# Patient Record
Sex: FEMALE | Race: WHITE | Employment: FULL TIME | URBAN - METROPOLITAN AREA
[De-identification: names, ages, dates, MRNs, and addresses within clinical notes are randomized per-mention and may not be internally consistent; named-entity substitution may affect disease eponyms.]

---

## 2019-09-17 ENCOUNTER — OFFICE VISIT (OUTPATIENT)
Dept: RHEUMATOLOGY | Age: 29
End: 2019-09-17
Payer: COMMERCIAL

## 2019-09-17 VITALS
BODY MASS INDEX: 36.8 KG/M2 | DIASTOLIC BLOOD PRESSURE: 80 MMHG | HEIGHT: 62 IN | SYSTOLIC BLOOD PRESSURE: 120 MMHG | WEIGHT: 200 LBS

## 2019-09-17 DIAGNOSIS — Z79.899 HIGH RISK MEDICATION USE: ICD-10-CM

## 2019-09-17 DIAGNOSIS — M06.09 RHEUMATOID ARTHRITIS OF MULTIPLE SITES WITH NEGATIVE RHEUMATOID FACTOR (HCC): Primary | ICD-10-CM

## 2019-09-17 LAB
A/G RATIO: 2.1 (ref 1.1–2.2)
ALBUMIN SERPL-MCNC: 5.2 G/DL (ref 3.4–5)
ALP BLD-CCNC: 57 U/L (ref 40–129)
ALT SERPL-CCNC: 15 U/L (ref 10–40)
ANION GAP SERPL CALCULATED.3IONS-SCNC: 15 MMOL/L (ref 3–16)
AST SERPL-CCNC: 18 U/L (ref 15–37)
BASOPHILS ABSOLUTE: 0 K/UL (ref 0–0.2)
BASOPHILS RELATIVE PERCENT: 0.3 %
BILIRUB SERPL-MCNC: 0.5 MG/DL (ref 0–1)
BUN BLDV-MCNC: 17 MG/DL (ref 7–20)
C-REACTIVE PROTEIN: 2 MG/L (ref 0–5.1)
CALCIUM SERPL-MCNC: 9.6 MG/DL (ref 8.3–10.6)
CHLORIDE BLD-SCNC: 99 MMOL/L (ref 99–110)
CO2: 24 MMOL/L (ref 21–32)
CREAT SERPL-MCNC: 0.8 MG/DL (ref 0.6–1.1)
EOSINOPHILS ABSOLUTE: 0.1 K/UL (ref 0–0.6)
EOSINOPHILS RELATIVE PERCENT: 1.4 %
GFR AFRICAN AMERICAN: >60
GFR NON-AFRICAN AMERICAN: >60
GLOBULIN: 2.5 G/DL
GLUCOSE BLD-MCNC: 92 MG/DL (ref 70–99)
HCT VFR BLD CALC: 36.7 % (ref 36–48)
HEMOGLOBIN: 12.5 G/DL (ref 12–16)
LYMPHOCYTES ABSOLUTE: 2.2 K/UL (ref 1–5.1)
LYMPHOCYTES RELATIVE PERCENT: 33.7 %
MCH RBC QN AUTO: 32 PG (ref 26–34)
MCHC RBC AUTO-ENTMCNC: 33.9 G/DL (ref 31–36)
MCV RBC AUTO: 94.4 FL (ref 80–100)
MONOCYTES ABSOLUTE: 0.6 K/UL (ref 0–1.3)
MONOCYTES RELATIVE PERCENT: 9 %
NEUTROPHILS ABSOLUTE: 3.6 K/UL (ref 1.7–7.7)
NEUTROPHILS RELATIVE PERCENT: 55.6 %
PDW BLD-RTO: 12.3 % (ref 12.4–15.4)
PLATELET # BLD: 287 K/UL (ref 135–450)
PMV BLD AUTO: 7.4 FL (ref 5–10.5)
POTASSIUM SERPL-SCNC: 4.3 MMOL/L (ref 3.5–5.1)
RBC # BLD: 3.89 M/UL (ref 4–5.2)
SEDIMENTATION RATE, ERYTHROCYTE: 12 MM/HR (ref 0–20)
SODIUM BLD-SCNC: 138 MMOL/L (ref 136–145)
TOTAL PROTEIN: 7.7 G/DL (ref 6.4–8.2)
WBC # BLD: 6.5 K/UL (ref 4–11)

## 2019-09-17 PROCEDURE — 99205 OFFICE O/P NEW HI 60 MIN: CPT | Performed by: INTERNAL MEDICINE

## 2019-09-17 RX ORDER — ADALIMUMAB 40MG/0.4ML
40 KIT SUBCUTANEOUS
COMMUNITY
Start: 2019-09-09 | End: 2021-07-19

## 2019-09-17 RX ORDER — M-VIT,TX,IRON,MINS/CALC/FOLIC 27MG-0.4MG
1 TABLET ORAL DAILY
COMMUNITY

## 2019-09-17 RX ORDER — SULFASALAZINE 500 MG/1
3 TABLET ORAL 2 TIMES DAILY
Refills: 1 | COMMUNITY
Start: 2019-07-22 | End: 2020-10-13 | Stop reason: SDUPTHER

## 2019-09-17 RX ORDER — SULFASALAZINE 500 MG/1
TABLET ORAL
Qty: 180 TABLET | Refills: 3 | Status: SHIPPED | OUTPATIENT
Start: 2019-09-17 | End: 2020-05-04 | Stop reason: SDUPTHER

## 2019-09-17 SDOH — HEALTH STABILITY: MENTAL HEALTH: HOW MANY STANDARD DRINKS CONTAINING ALCOHOL DO YOU HAVE ON A TYPICAL DAY?: 1 OR 2

## 2019-09-17 SDOH — HEALTH STABILITY: MENTAL HEALTH: HOW OFTEN DO YOU HAVE A DRINK CONTAINING ALCOHOL?: 2-4 TIMES A MONTH

## 2019-09-17 NOTE — PROGRESS NOTES
BMI 36.58 kg/m²   General appearance/ Psychiatric: well nourished, and well groomed, normal judgement, alert, appears stated age and cooperative. MKS: Normal musculoskeletal examination and upper, lower extremities and spine without any subjective or objective findings. Skin: No rashes, no induration or skin thickening or nodules. No evidence ischemia or deformities noted in digits or nails. HEENT: Normal lids, lacrimal glands and pupils. No oral or nasal ulcers. Salivary glands reveal no evidence of abnormality. External inspection of the ears and nose within normal limits. Neck: No masses or asymmetry. No thyroid enlargement. Chest: Normal effort, clear to auscultation. Lymph nodes: No enlargement in cervical, supraclavicular regions. Neurologic: normal deep tendon reflexes. No foot or wrist drop. DATA:   A/P- See above.

## 2019-09-20 LAB
QUANTI TB GOLD PLUS: NEGATIVE
QUANTI TB1 MINUS NIL: 0 IU/ML (ref 0–0.34)
QUANTI TB2 MINUS NIL: 0 IU/ML (ref 0–0.34)
QUANTIFERON MITOGEN: 9.38 IU/ML
QUANTIFERON NIL: 0.02 IU/ML

## 2019-12-10 ENCOUNTER — OFFICE VISIT (OUTPATIENT)
Dept: RHEUMATOLOGY | Age: 29
End: 2019-12-10
Payer: COMMERCIAL

## 2019-12-10 VITALS
HEIGHT: 62 IN | BODY MASS INDEX: 37.54 KG/M2 | WEIGHT: 204 LBS | SYSTOLIC BLOOD PRESSURE: 120 MMHG | DIASTOLIC BLOOD PRESSURE: 78 MMHG

## 2019-12-10 DIAGNOSIS — M06.09 RHEUMATOID ARTHRITIS OF MULTIPLE SITES WITH NEGATIVE RHEUMATOID FACTOR (HCC): ICD-10-CM

## 2019-12-10 DIAGNOSIS — Z79.899 HIGH RISK MEDICATION USE: Primary | ICD-10-CM

## 2019-12-10 LAB
ALBUMIN SERPL-MCNC: 5.1 G/DL (ref 3.4–5)
ALP BLD-CCNC: 57 U/L (ref 40–129)
ALT SERPL-CCNC: 14 U/L (ref 10–40)
AST SERPL-CCNC: 17 U/L (ref 15–37)
BASOPHILS ABSOLUTE: 0 K/UL (ref 0–0.2)
BASOPHILS RELATIVE PERCENT: 0.4 %
BILIRUB SERPL-MCNC: 0.5 MG/DL (ref 0–1)
BILIRUBIN DIRECT: <0.2 MG/DL (ref 0–0.3)
BILIRUBIN, INDIRECT: ABNORMAL MG/DL (ref 0–1)
C-REACTIVE PROTEIN: 2.1 MG/L (ref 0–5.1)
CREAT SERPL-MCNC: 0.6 MG/DL (ref 0.6–1.1)
EOSINOPHILS ABSOLUTE: 0.1 K/UL (ref 0–0.6)
EOSINOPHILS RELATIVE PERCENT: 2 %
GFR AFRICAN AMERICAN: >60
GFR NON-AFRICAN AMERICAN: >60
HCT VFR BLD CALC: 36.5 % (ref 36–48)
HEMOGLOBIN: 12.5 G/DL (ref 12–16)
LYMPHOCYTES ABSOLUTE: 3 K/UL (ref 1–5.1)
LYMPHOCYTES RELATIVE PERCENT: 42.9 %
MCH RBC QN AUTO: 32.4 PG (ref 26–34)
MCHC RBC AUTO-ENTMCNC: 34.2 G/DL (ref 31–36)
MCV RBC AUTO: 94.8 FL (ref 80–100)
MONOCYTES ABSOLUTE: 0.7 K/UL (ref 0–1.3)
MONOCYTES RELATIVE PERCENT: 10.7 %
NEUTROPHILS ABSOLUTE: 3 K/UL (ref 1.7–7.7)
NEUTROPHILS RELATIVE PERCENT: 44 %
PDW BLD-RTO: 12.3 % (ref 12.4–15.4)
PLATELET # BLD: 274 K/UL (ref 135–450)
PMV BLD AUTO: 7.9 FL (ref 5–10.5)
RBC # BLD: 3.85 M/UL (ref 4–5.2)
SEDIMENTATION RATE, ERYTHROCYTE: 16 MM/HR (ref 0–20)
TOTAL PROTEIN: 7.8 G/DL (ref 6.4–8.2)
WBC # BLD: 6.9 K/UL (ref 4–11)

## 2019-12-10 PROCEDURE — 99214 OFFICE O/P EST MOD 30 MIN: CPT | Performed by: INTERNAL MEDICINE

## 2020-03-09 ENCOUNTER — OFFICE VISIT (OUTPATIENT)
Dept: RHEUMATOLOGY | Age: 30
End: 2020-03-09
Payer: COMMERCIAL

## 2020-03-09 VITALS
BODY MASS INDEX: 38.83 KG/M2 | SYSTOLIC BLOOD PRESSURE: 120 MMHG | HEIGHT: 62 IN | DIASTOLIC BLOOD PRESSURE: 78 MMHG | WEIGHT: 211 LBS

## 2020-03-09 LAB
A/G RATIO: 1.8 (ref 1.1–2.2)
ALBUMIN SERPL-MCNC: 4.8 G/DL (ref 3.4–5)
ALP BLD-CCNC: 62 U/L (ref 40–129)
ALT SERPL-CCNC: 16 U/L (ref 10–40)
ANION GAP SERPL CALCULATED.3IONS-SCNC: 13 MMOL/L (ref 3–16)
AST SERPL-CCNC: 20 U/L (ref 15–37)
BASOPHILS ABSOLUTE: 0 K/UL (ref 0–0.2)
BASOPHILS RELATIVE PERCENT: 0.8 %
BILIRUB SERPL-MCNC: 0.7 MG/DL (ref 0–1)
BUN BLDV-MCNC: 12 MG/DL (ref 7–20)
C-REACTIVE PROTEIN: 3.3 MG/L (ref 0–5.1)
CALCIUM SERPL-MCNC: 9.2 MG/DL (ref 8.3–10.6)
CHLORIDE BLD-SCNC: 104 MMOL/L (ref 99–110)
CO2: 22 MMOL/L (ref 21–32)
CREAT SERPL-MCNC: 0.6 MG/DL (ref 0.6–1.1)
EOSINOPHILS ABSOLUTE: 0.4 K/UL (ref 0–0.6)
EOSINOPHILS RELATIVE PERCENT: 8.4 %
GFR AFRICAN AMERICAN: >60
GFR NON-AFRICAN AMERICAN: >60
GLOBULIN: 2.6 G/DL
GLUCOSE BLD-MCNC: 93 MG/DL (ref 70–99)
HCT VFR BLD CALC: 34.7 % (ref 36–48)
HEMOGLOBIN: 11.8 G/DL (ref 12–16)
LYMPHOCYTES ABSOLUTE: 2.1 K/UL (ref 1–5.1)
LYMPHOCYTES RELATIVE PERCENT: 40.4 %
MCH RBC QN AUTO: 32.4 PG (ref 26–34)
MCHC RBC AUTO-ENTMCNC: 34.2 G/DL (ref 31–36)
MCV RBC AUTO: 94.8 FL (ref 80–100)
MONOCYTES ABSOLUTE: 0.4 K/UL (ref 0–1.3)
MONOCYTES RELATIVE PERCENT: 7.7 %
NEUTROPHILS ABSOLUTE: 2.2 K/UL (ref 1.7–7.7)
NEUTROPHILS RELATIVE PERCENT: 42.7 %
PDW BLD-RTO: 12.4 % (ref 12.4–15.4)
PLATELET # BLD: 255 K/UL (ref 135–450)
PMV BLD AUTO: 7.4 FL (ref 5–10.5)
POTASSIUM SERPL-SCNC: 4 MMOL/L (ref 3.5–5.1)
RBC # BLD: 3.66 M/UL (ref 4–5.2)
SODIUM BLD-SCNC: 139 MMOL/L (ref 136–145)
TOTAL PROTEIN: 7.4 G/DL (ref 6.4–8.2)
WBC # BLD: 5.3 K/UL (ref 4–11)

## 2020-03-09 PROCEDURE — 99214 OFFICE O/P EST MOD 30 MIN: CPT | Performed by: INTERNAL MEDICINE

## 2020-03-09 NOTE — PROGRESS NOTES
autoimmune diseases    Current Outpatient Medications   Medication Sig Dispense Refill    Adalimumab (HUMIRA) 40 MG/0.4ML PSKT Inject 40 mg sc Q 14 days. Please dispense pen. 2 each 5    HUMIRA 40 MG/0.4ML PSKT Inject 40 mg into the skin every 14 days      sulfaSALAzine (AZULFIDINE) 500 MG tablet Take 3 tablets by mouth 2 times daily  1    Multiple Vitamins-Minerals (THERAPEUTIC MULTIVITAMIN-MINERALS) tablet Take 1 tablet by mouth daily      sulfaSALAzine (AZULFIDINE) 500 MG tablet Take 3 tab po  tablet 3    Adalimumab (HUMIRA) 40 MG/0.4ML PSKT Inject 40 mg sc Q 14 days. 2 each 5     No current facility-administered medications for this visit. Allergies   Allergen Reactions    Amoxicillin     Pcn [Penicillins]        PHYSICAL EXAM:    Vitals:    /78   Ht 5' 2.01\" (1.575 m)   Wt 211 lb (95.7 kg)   BMI 38.58 kg/m²   General appearance/ Psychiatric: well nourished, and well groomed, normal judgement, alert, appears stated age and cooperative. MKS: Normal musculoskeletal examination upper, lower extremities and spine. She does not have any tender, swollen or inflamed joints in upper or lower extremities. Skin: No rashes, no induration or skin thickening or nodules. No evidence ischemia or deformities noted in digits or nails.     DATA:   Lab Results   Component Value Date    WBC 6.9 12/10/2019    HGB 12.5 12/10/2019    HCT 36.5 12/10/2019    MCV 94.8 12/10/2019     12/10/2019     Lab Results   Component Value Date     09/17/2019    K 4.3 09/17/2019    CL 99 09/17/2019    CO2 24 09/17/2019    BUN 17 09/17/2019    CREATININE 0.6 12/10/2019    GLUCOSE 92 09/17/2019    CALCIUM 9.6 09/17/2019    PROT 7.8 12/10/2019    LABALBU 5.1 (H) 12/10/2019    BILITOT 0.5 12/10/2019    ALKPHOS 57 12/10/2019    AST 17 12/10/2019    ALT 14 12/10/2019    LABGLOM >60 12/10/2019    GFRAA >60 12/10/2019    AGRATIO 2.1 09/17/2019    GLOB 2.5 09/17/2019       Lab Results   Component Value Date

## 2020-05-04 RX ORDER — SULFASALAZINE 500 MG/1
TABLET ORAL
Qty: 180 TABLET | Refills: 0 | Status: SHIPPED | OUTPATIENT
Start: 2020-05-04 | End: 2020-06-08

## 2020-06-22 ENCOUNTER — VIRTUAL VISIT (OUTPATIENT)
Dept: RHEUMATOLOGY | Age: 30
End: 2020-06-22
Payer: COMMERCIAL

## 2020-06-22 PROCEDURE — 99214 OFFICE O/P EST MOD 30 MIN: CPT | Performed by: INTERNAL MEDICINE

## 2020-06-24 LAB
ALT SERPL-CCNC: 15 U/L (ref 10–40)
AST SERPL-CCNC: 17 U/L (ref 15–37)
BASOPHILS ABSOLUTE: 0 K/UL (ref 0–0.2)
BASOPHILS RELATIVE PERCENT: 0.4 %
C-REACTIVE PROTEIN: 1.9 MG/L (ref 0–5.1)
CREAT SERPL-MCNC: 0.5 MG/DL (ref 0.6–1.1)
EOSINOPHILS ABSOLUTE: 0.1 K/UL (ref 0–0.6)
EOSINOPHILS RELATIVE PERCENT: 2.5 %
GFR AFRICAN AMERICAN: >60
GFR NON-AFRICAN AMERICAN: >60
HCT VFR BLD CALC: 37.1 % (ref 36–48)
HEMOGLOBIN: 12.6 G/DL (ref 12–16)
LYMPHOCYTES ABSOLUTE: 1.9 K/UL (ref 1–5.1)
LYMPHOCYTES RELATIVE PERCENT: 40.4 %
MCH RBC QN AUTO: 32.3 PG (ref 26–34)
MCHC RBC AUTO-ENTMCNC: 33.8 G/DL (ref 31–36)
MCV RBC AUTO: 95.4 FL (ref 80–100)
MONOCYTES ABSOLUTE: 0.4 K/UL (ref 0–1.3)
MONOCYTES RELATIVE PERCENT: 8.9 %
NEUTROPHILS ABSOLUTE: 2.3 K/UL (ref 1.7–7.7)
NEUTROPHILS RELATIVE PERCENT: 47.8 %
PDW BLD-RTO: 12.2 % (ref 12.4–15.4)
PLATELET # BLD: 269 K/UL (ref 135–450)
PMV BLD AUTO: 7.8 FL (ref 5–10.5)
RBC # BLD: 3.89 M/UL (ref 4–5.2)
SEDIMENTATION RATE, ERYTHROCYTE: 16 MM/HR (ref 0–20)
WBC # BLD: 4.8 K/UL (ref 4–11)

## 2020-07-27 RX ORDER — ADALIMUMAB 40MG/0.4ML
KIT SUBCUTANEOUS
Qty: 2 EACH | Refills: 2 | Status: SHIPPED | OUTPATIENT
Start: 2020-07-27 | End: 2021-06-24 | Stop reason: SDUPTHER

## 2020-09-01 RX ORDER — SULFASALAZINE 500 MG/1
TABLET ORAL
Qty: 540 TABLET | Refills: 0 | Status: SHIPPED | OUTPATIENT
Start: 2020-09-01 | End: 2021-07-19

## 2020-10-13 ENCOUNTER — OFFICE VISIT (OUTPATIENT)
Dept: RHEUMATOLOGY | Age: 30
End: 2020-10-13
Payer: COMMERCIAL

## 2020-10-13 VITALS — WEIGHT: 211 LBS | BODY MASS INDEX: 38.83 KG/M2 | HEIGHT: 62 IN | TEMPERATURE: 98 F

## 2020-10-13 LAB
ALT SERPL-CCNC: 15 U/L (ref 10–40)
AST SERPL-CCNC: 16 U/L (ref 15–37)
BASOPHILS ABSOLUTE: 0 K/UL (ref 0–0.2)
BASOPHILS RELATIVE PERCENT: 0.4 %
CREAT SERPL-MCNC: 0.8 MG/DL (ref 0.6–1.1)
EOSINOPHILS ABSOLUTE: 0.1 K/UL (ref 0–0.6)
EOSINOPHILS RELATIVE PERCENT: 1.2 %
GFR AFRICAN AMERICAN: >60
GFR NON-AFRICAN AMERICAN: >60
HCT VFR BLD CALC: 38 % (ref 36–48)
HEMOGLOBIN: 12.8 G/DL (ref 12–16)
LYMPHOCYTES ABSOLUTE: 2.7 K/UL (ref 1–5.1)
LYMPHOCYTES RELATIVE PERCENT: 37.9 %
MCH RBC QN AUTO: 31.8 PG (ref 26–34)
MCHC RBC AUTO-ENTMCNC: 33.8 G/DL (ref 31–36)
MCV RBC AUTO: 94 FL (ref 80–100)
MONOCYTES ABSOLUTE: 0.6 K/UL (ref 0–1.3)
MONOCYTES RELATIVE PERCENT: 8 %
NEUTROPHILS ABSOLUTE: 3.7 K/UL (ref 1.7–7.7)
NEUTROPHILS RELATIVE PERCENT: 52.5 %
PDW BLD-RTO: 12.3 % (ref 12.4–15.4)
PLATELET # BLD: 321 K/UL (ref 135–450)
PMV BLD AUTO: 7.5 FL (ref 5–10.5)
RBC # BLD: 4.04 M/UL (ref 4–5.2)
WBC # BLD: 7.1 K/UL (ref 4–11)

## 2020-10-13 PROCEDURE — 85025 COMPLETE CBC W/AUTO DIFF WBC: CPT | Performed by: INTERNAL MEDICINE

## 2020-10-13 PROCEDURE — 99214 OFFICE O/P EST MOD 30 MIN: CPT | Performed by: INTERNAL MEDICINE

## 2020-10-13 RX ORDER — ADALIMUMAB 40MG/0.4ML
40 KIT SUBCUTANEOUS
Qty: 2 EACH | Refills: 3 | Status: SHIPPED | OUTPATIENT
Start: 2020-10-13 | End: 2021-01-18

## 2020-10-13 RX ORDER — SULFASALAZINE 500 MG/1
1500 TABLET ORAL 2 TIMES DAILY
Qty: 540 TABLET | Refills: 0 | Status: SHIPPED | OUTPATIENT
Start: 2020-10-13 | End: 2021-05-11

## 2020-10-13 NOTE — PROGRESS NOTES
[address] [facility], [place]                                                                                                                        [phone] (P) [phone] (F)      Dear  No primary care provider on file.:  Please find Rheumatology assessment. Thank you for giving me the opportunity to be involved in Laura Elam's care and I look forward following Laura along with you. If you have any questions or concerns please feel free to reach me. Note is transcribed using voice recognition software. Inadvertent computerized transcription errors may be present. Patient identification: Antionette Hun: 1990,30 y.o. Sex: female     A/P  Laura was seen today for follow-up. Diagnoses and all orders for this visit:    Rheumatoid arthritis involving multiple sites with positive rheumatoid factor (HCC)  -     Adalimumab (HUMIRA PEN) 40 MG/0.4ML PNKT; Inject 40 mg into the skin every 14 days  -     sulfaSALAzine (AZULFIDINE) 500 MG tablet; Take 3 tablets by mouth 2 times daily    High risk medication use  -     CBC Auto Differential  -     AST(SGOT) & ALT(SGPT)  -     Creatinine, Serum      Rheumatoid arthritis-onset 2012-dramatic on Humira 40 mg every 2 weekly (started 1/20/2018, and sulfasalazine 1.5 g twice daily. Normal ADLs and recreational activities. .    Previous therapy included methotrexate, Plaquenil and Enbrel-lost efficacy after 1 year, Orencia-did not help. Plan-  Check safety labs today, may try reducing sulfasalazine 1 g twice daily, stay on current dose of Humira. If she gets symptomatic, increase sulfasalazine to the previous doses. Recommend flu shot. Follow-up in 3 months. Patient indicates understanding and agrees with the management plan.   I reviewed patient's history, referral documents and electronic medical records. #######################################################################    Zzlfjxjcch-kufadt-tq for seronegative rheumatoid arthritis. Interval Junious Ama is doing very well. Has no complaints or concerns. No flares since last seen. Is pleased with current regimen. Tolerating medications well. Denies any pain, swelling, stiffness in her joints. ADLs and recreational activities are normal. Tolerating medications well. All other ROS are negative. Past Medical History:   Diagnosis Date    Rheumatoid arthritis ([facility] [address] 2012     History reviewed. No pertinent surgical history. Social History     Socioeconomic History    Marital status:      Spouse name: Not on file    Number of children: Not on file    Years of education: Not on file    Highest education level: Not on file   Occupational History    Not on file   Social Needs    Financial resource strain: Not on file    Food insecurity     Worry: Not on file     Inability: Not on file    Transportation needs     Medical: Not on file     Non-medical: Not on file   Tobacco Use    Smoking status: Never Smoker    Smokeless tobacco: Never Used   Substance and Sexual Activity    Alcohol use:  Yes     Alcohol/week: 6.0 standard drinks     Types: 2 Glasses of wine, 4 Shots of liquor per week     Frequency: 2-4 times a month     Drinks per session: 1 or 2    Drug use: Never    Sexual activity: Not on file   Lifestyle    Physical activity     Days per week: Not on file     Minutes per session: Not on file    Stress: Not on file   Relationships    Social connections     Talks on phone: Not on file     Gets together: Not on file     Attends Denominational service: Not on file     Active member of club or organization: Not on file     Attends meetings of clubs or organizations: Not on file     Relationship status: Not on file    Intimate partner violence     Fear of current or ex partner: Not on file Emotionally abused: Not on file     Physically abused: Not on file     Forced sexual activity: Not on file   Other Topics Concern    Not on file   Social History Narrative    Not on file       No family history of autoimmune diseases    Current Outpatient Medications   Medication Sig Dispense Refill    Adalimumab (HUMIRA PEN) 40 MG/0.4ML PNKT Inject 40 mg into the skin every 14 days 2 each 3    sulfaSALAzine (AZULFIDINE) 500 MG tablet Take 3 tablets by mouth 2 times daily 540 tablet 0    sulfaSALAzine (AZULFIDINE) 500 MG tablet TAKE 3 TABLETS BY MOUTH TWICE A  tablet 0    HUMIRA 40 MG/0.4ML PSKT INJECT 1 SYRINGE UNDER THE SKIN EVERY 14 DAYS. 2 each 2    HUMIRA 40 MG/0.4ML PSKT Inject 40 mg into the skin every 14 days      Multiple Vitamins-Minerals (THERAPEUTIC MULTIVITAMIN-MINERALS) tablet Take 1 tablet by mouth daily      Adalimumab (HUMIRA) 40 MG/0.4ML PSKT Inject 40 mg sc Q 14 days. 2 each 5     No current facility-administered medications for this visit. Allergies   Allergen Reactions    Amoxicillin     Pcn [Penicillins]        PHYSICAL EXAM:    Vitals:    Temp 98 °F (36.7 °C) (Skin)   Ht 5' 2\" (1.575 m)   Wt 211 lb (95.7 kg)   BMI 38.59 kg/m²   General appearance/ Psychiatric: well nourished, and well groomed, normal judgement, alert, appears stated age and cooperative. MKS: She does not have any tender, swollen or inflamed joints in the upper or lower extremities, full range of motion all peripheral joints. Skin: No rashes,No evidence ischemia or deformities in digits or nails.       DATA:   Lab Results   Component Value Date    WBC 4.8 06/24/2020    HGB 12.6 06/24/2020    HCT 37.1 06/24/2020    MCV 95.4 06/24/2020     06/24/2020     Lab Results   Component Value Date     03/09/2020    K 4.0 03/09/2020     03/09/2020    CO2 22 03/09/2020    BUN 12 03/09/2020    CREATININE 0.5 (L) 06/24/2020    GLUCOSE 93 03/09/2020    CALCIUM 9.2 03/09/2020    PROT 7.4 03/09/2020    LABALBU 4.8 03/09/2020    BILITOT 0.7 03/09/2020    ALKPHOS 62 03/09/2020    AST 17 06/24/2020    ALT 15 06/24/2020    LABGLOM >60 06/24/2020    GFRAA >60 06/24/2020    AGRATIO 1.8 03/09/2020    GLOB 2.6 03/09/2020       Lab Results   Component Value Date    SEDRATE 16 06/24/2020     Lab Results   Component Value Date    SEDRATE 16 06/24/2020       A/P- See above.

## 2021-01-14 ENCOUNTER — VIRTUAL VISIT (OUTPATIENT)
Dept: RHEUMATOLOGY | Age: 31
End: 2021-01-14
Payer: COMMERCIAL

## 2021-01-14 DIAGNOSIS — M05.79 RHEUMATOID ARTHRITIS INVOLVING MULTIPLE SITES WITH POSITIVE RHEUMATOID FACTOR (HCC): Primary | ICD-10-CM

## 2021-01-14 DIAGNOSIS — Z79.899 HIGH RISK MEDICATION USE: ICD-10-CM

## 2021-01-14 PROCEDURE — 99214 OFFICE O/P EST MOD 30 MIN: CPT | Performed by: INTERNAL MEDICINE

## 2021-01-14 NOTE — PROGRESS NOTES
Jesus Carmona is a 27 y.o. female being evaluated by a Virtual Visit (video visit) encounter to address concerns as mentioned above. A caregiver was present when appropriate. Due to this being a TeleHealth encounter ([facility]), evaluation of the following organ systems was limited: Vitals/Constitutional/EENT/Resp/CV/GI//MS/Neuro/Skin/Heme-Lymph-Imm. Pursuant to the emergency declaration under the [address] [place] [address] and the [employer] Act, this Virtual Visit was conducted with patient's (and/or legal guardian's) consent, to reduce the patient's risk of exposure to COVID-19 and provide necessary medical care. The patient (and/or legal guardian) has also been advised to contact this office for worsening conditions or problems, and seek emergency medical treatment and/or call 911 if deemed necessary. Patient identification was verified at the start of the visit: Yes    Total time spent for this encounter: Not billed by time    Services were provided through a video synchronous discussion virtually to substitute for in-person clinic visit. Patient and provider were located at their individual homes. --Vicente Veloz MD on 1/14/2021 at 1:39 PM    An electronic signature was used to authenticate this note. [address] [place] [address], [place]                                                                                                                        [phone] (U) [phone] (F)      Dear Dr. White primary care provider on file.:  Please find Rheumatology assessment. Thank you for giving me the opportunity to be involved in Laura Papa's care and I look forward following Laura zamarripa with you. If you have any questions or concerns please feel free to reach me. Note is transcribed using voice recognition software. Inadvertent computerized transcription errors may be present. Patient identification: Chin Aleman: 1990,30 y.o. Sex: female     A/P  Laura was seen today for follow-up and arthritis. Diagnoses and all orders for this visit:    Rheumatoid arthritis involving multiple sites with positive rheumatoid factor (HCC)    High risk medication use  -     AST(SGOT) & ALT(SGPT); Standing  -     CBC Auto Differential; Standing  -     C-Reactive Protein; Standing  -     Creatinine, Serum; Standing  -     Sedimentation Rate; Standing      Rheumatoid arthritis-onset 2012-doing well on current regimen  Humira 40 mg every 2 weekly (started 1/20/2018, and sulfasalazine 1 g twice daily. Normal ADLs and recreational activities. .    Previous therapy included methotrexate, Plaquenil and Enbrel-lost efficacy after 1 year, Orencia-did not help. Plan-  Due for safety labs, may reduce sulfasalazine 1 tablet p.o. twice daily, currently taking 2 tablets twice a day. If she notices musculoskeletal symptoms, resume original doses. Continue Humira. Call me with any flares or symptoms. Recommendations for COVID-19 vaccine was discussed with patient. Follow-up in 3 months. Patient indicates understanding and agrees with the management plan. I reviewed patient's history, referral documents and electronic medical records. #######################################################################    Llnelaeqgh-ifxdjw-tm for seronegative rheumatoid arthritis. Interval Chela Session is doing well on current regimen, denies any pain, swelling, stiffness in her joints. Normal ADLs and recreational activities. Pleased with current therapy. Her previous labs are normal.   ADLs and recreational activities are normal.  All other ROS are negative.         No family history of autoimmune diseases    Current Outpatient Medications   Medication Sig Dispense Refill    Adalimumab (HUMIRA PEN) 40 MG/0.4ML PNKT Inject 40 mg into the skin every 14 days 2 each 3    sulfaSALAzine (AZULFIDINE) 500 MG tablet Take 3 tablets by mouth 2 times daily 540 tablet 0    sulfaSALAzine (AZULFIDINE) 500 MG tablet TAKE 3 TABLETS BY MOUTH TWICE A  tablet 0    HUMIRA 40 MG/0.4ML PSKT INJECT 1 SYRINGE UNDER THE SKIN EVERY 14 DAYS. 2 each 2    HUMIRA 40 MG/0.4ML PSKT Inject 40 mg into the skin every 14 days      Multiple Vitamins-Minerals (THERAPEUTIC MULTIVITAMIN-MINERALS) tablet Take 1 tablet by mouth daily      Adalimumab (HUMIRA) 40 MG/0.4ML PSKT Inject 40 mg sc Q 14 days. 2 each 5     No current facility-administered medications for this visit. Allergies   Allergen Reactions    Amoxicillin     Pcn [Penicillins]        PHYSICAL EXAM:    Vitals: There were no vitals taken for this visit. General appearance/ Psychiatric: well nourished, and well groomed, normal judgement, alert, appears stated age and cooperative. No musculoskeletal findings to be seen in VS.  No subjective objective concerns. DATA:   Lab Results   Component Value Date    WBC 7.1 10/13/2020    HGB 12.8 10/13/2020    HCT 38.0 10/13/2020    MCV 94.0 10/13/2020     10/13/2020     Lab Results   Component Value Date     03/09/2020    K 4.0 03/09/2020     03/09/2020    CO2 22 03/09/2020    BUN 12 03/09/2020    CREATININE 0.8 10/13/2020    GLUCOSE 93 03/09/2020    CALCIUM 9.2 03/09/2020    PROT 7.4 03/09/2020    LABALBU 4.8 03/09/2020    BILITOT 0.7 03/09/2020    ALKPHOS 62 03/09/2020    AST 16 10/13/2020    ALT 15 10/13/2020    LABGLOM >60 10/13/2020    GFRAA >60 10/13/2020    AGRATIO 1.8 03/09/2020    GLOB 2.6 03/09/2020       Lab Results   Component Value Date    SEDRATE 16 06/24/2020     Lab Results   Component Value Date    SEDRATE 16 06/24/2020       A/P- See above.

## 2021-04-16 ENCOUNTER — OFFICE VISIT (OUTPATIENT)
Dept: RHEUMATOLOGY | Age: 31
End: 2021-04-16
Payer: COMMERCIAL

## 2021-04-16 VITALS — TEMPERATURE: 97.2 F | HEIGHT: 62 IN | WEIGHT: 211 LBS | BODY MASS INDEX: 38.83 KG/M2

## 2021-04-16 DIAGNOSIS — M05.79 RHEUMATOID ARTHRITIS INVOLVING MULTIPLE SITES WITH POSITIVE RHEUMATOID FACTOR (HCC): Primary | ICD-10-CM

## 2021-04-16 DIAGNOSIS — Z79.899 HIGH RISK MEDICATION USE: ICD-10-CM

## 2021-04-16 LAB
ALT SERPL-CCNC: 15 U/L (ref 10–40)
AST SERPL-CCNC: 17 U/L (ref 15–37)
BASOPHILS ABSOLUTE: 0 K/UL (ref 0–0.2)
BASOPHILS RELATIVE PERCENT: 0.4 %
C-REACTIVE PROTEIN: <3 MG/L (ref 0–5.1)
CREAT SERPL-MCNC: 0.6 MG/DL (ref 0.6–1.1)
EOSINOPHILS ABSOLUTE: 0.2 K/UL (ref 0–0.6)
EOSINOPHILS RELATIVE PERCENT: 4.1 %
GFR AFRICAN AMERICAN: >60
GFR NON-AFRICAN AMERICAN: >60
HCT VFR BLD CALC: 36 % (ref 36–48)
HEMOGLOBIN: 12.1 G/DL (ref 12–16)
LYMPHOCYTES ABSOLUTE: 2.1 K/UL (ref 1–5.1)
LYMPHOCYTES RELATIVE PERCENT: 37.4 %
MCH RBC QN AUTO: 31.6 PG (ref 26–34)
MCHC RBC AUTO-ENTMCNC: 33.7 G/DL (ref 31–36)
MCV RBC AUTO: 93.8 FL (ref 80–100)
MONOCYTES ABSOLUTE: 0.5 K/UL (ref 0–1.3)
MONOCYTES RELATIVE PERCENT: 8.9 %
NEUTROPHILS ABSOLUTE: 2.7 K/UL (ref 1.7–7.7)
NEUTROPHILS RELATIVE PERCENT: 49.2 %
PDW BLD-RTO: 12.4 % (ref 12.4–15.4)
PLATELET # BLD: 246 K/UL (ref 135–450)
PMV BLD AUTO: 7.6 FL (ref 5–10.5)
RBC # BLD: 3.84 M/UL (ref 4–5.2)
SEDIMENTATION RATE, ERYTHROCYTE: 15 MM/HR (ref 0–20)
WBC # BLD: 5.6 K/UL (ref 4–11)

## 2021-04-16 PROCEDURE — 99214 OFFICE O/P EST MOD 30 MIN: CPT | Performed by: INTERNAL MEDICINE

## 2021-04-16 RX ORDER — ADALIMUMAB 40MG/0.4ML
KIT SUBCUTANEOUS
Qty: 2 EACH | Refills: 3 | Status: SHIPPED | OUTPATIENT
Start: 2021-04-16 | End: 2021-07-19

## 2021-04-16 NOTE — PROGRESS NOTES
[address] [place] [id number] [phone] (A) [phone] (F)      Dear Dr. Whtie primary care provider on file.:  Please find Rheumatology assessment. Thank you for giving me the opportunity to be involved in Laura Elam's care and I look forward following Laura along with you. If you have any questions or concerns please feel free to reach me. Note is transcribed using voice recognition software. Inadvertent computerized transcription errors may be present. Patient identification: Yoli Colon: 1990,30 y.o. Sex: female     A/P  Laura was seen today for follow-up. Diagnoses and all orders for this visit:    Rheumatoid arthritis involving multiple sites with positive rheumatoid factor (HCC)    High risk medication use  -     Sedimentation Rate  -     Creatinine, Serum  -     C-Reactive Protein  -     CBC Auto Differential  -     AST(SGOT) & ALT(SGPT)    Other orders  -     Adalimumab (HUMIRA PEN) 40 MG/0.4ML PNKT; Inject 40 mg sc Q 14 days      Rheumatoid arthritis-onset 2012-in remission on Humira 40 mg every 2 weekly (started 1/20/2018, and sulfasalazine 1 g twice daily. Normal ADLs and recreational activities. .    Previous therapy included methotrexate, Plaquenil and Enbrel-lost efficacy after 1 year, Orencia-did not help. Plan-  Check safety labs today. May try reducing sulfasalazine 3 tablets a day for next 6 weeks, then 2 tablets a day thereafter. Stay on current dose of Humira. If she does experience worsening symptoms, resume full dose of sulfasalazine. All her questions were answered. Follow-up in 3 months.    Patient indicates understanding and agrees with the management plan. I reviewed patient's history, referral documents and electronic medical records. #######################################################################    Xdjudwbuel-wspbpq-vj for seronegative rheumatoid arthritis. Interval Oumou Murphy is doing well in terms of rheumatoid arthritis denies pain, swelling or stiffness in her joints. No AM stiffness. ADLs and recreational activities are normal.  Pleased with current therapy. Tolerating medications well. All other ROS are negative. No family history of autoimmune diseases    Current Outpatient Medications   Medication Sig Dispense Refill    Adalimumab (HUMIRA PEN) 40 MG/0.4ML PNKT Inject 40 mg sc Q 14 days 2 each 3    HUMIRA PEN 40 MG/0.4ML PNKT INJECT 1 PEN UNDER THE SKIN EVERY 14 DAYS. 2 each 2    sulfaSALAzine (AZULFIDINE) 500 MG tablet Take 3 tablets by mouth 2 times daily 540 tablet 0    sulfaSALAzine (AZULFIDINE) 500 MG tablet TAKE 3 TABLETS BY MOUTH TWICE A  tablet 0    HUMIRA 40 MG/0.4ML PSKT INJECT 1 SYRINGE UNDER THE SKIN EVERY 14 DAYS. 2 each 2    HUMIRA 40 MG/0.4ML PSKT Inject 40 mg into the skin every 14 days      Multiple Vitamins-Minerals (THERAPEUTIC MULTIVITAMIN-MINERALS) tablet Take 1 tablet by mouth daily      Adalimumab (HUMIRA) 40 MG/0.4ML PSKT Inject 40 mg sc Q 14 days. 2 each 5     No current facility-administered medications for this visit. Allergies   Allergen Reactions    Amoxicillin     Pcn [Penicillins]        PHYSICAL EXAM:    Vitals:    Temp 97.2 °F (36.2 °C) (Skin)   Ht 5' 2\" (1.575 m)   Wt 211 lb (95.7 kg)   BMI 38.59 kg/m²   General appearance/ Psychiatric: well nourished, and well groomed, normal judgement, alert, appears stated age and cooperative. Musculoskeletal-normal joint examination and upper, lower extremities and spine without any tenderness, swelling or synovitis, full range of motion. Normal gait and muscle strength upper and lower extremities.   Skin-no nabil. Normal effort of breathing. DATA:   Lab Results   Component Value Date    WBC 7.1 10/13/2020    HGB 12.8 10/13/2020    HCT 38.0 10/13/2020    MCV 94.0 10/13/2020     10/13/2020     Lab Results   Component Value Date     03/09/2020    K 4.0 03/09/2020     03/09/2020    CO2 22 03/09/2020    BUN 12 03/09/2020    CREATININE 0.8 10/13/2020    GLUCOSE 93 03/09/2020    CALCIUM 9.2 03/09/2020    PROT 7.4 03/09/2020    LABALBU 4.8 03/09/2020    BILITOT 0.7 03/09/2020    ALKPHOS 62 03/09/2020    AST 16 10/13/2020    ALT 15 10/13/2020    LABGLOM >60 10/13/2020    GFRAA >60 10/13/2020    AGRATIO 1.8 03/09/2020    GLOB 2.6 03/09/2020       Lab Results   Component Value Date    SEDRATE 16 06/24/2020     Lab Results   Component Value Date    SEDRATE 16 06/24/2020       Total time 32 minutes that includes the following-  Preparing to see the patient such as reviewing patients records, pre-charting, preparing the visit on the same day, performing a medically appropriate history and physical examination, counseling and educating patient about diagnosis, management plan, ordering appropriate testings, prescriptions, communicating findings to other care providers, and documenting clinical information in electronic medical record.

## 2021-05-11 DIAGNOSIS — M05.79 RHEUMATOID ARTHRITIS INVOLVING MULTIPLE SITES WITH POSITIVE RHEUMATOID FACTOR (HCC): ICD-10-CM

## 2021-05-11 RX ORDER — SULFASALAZINE 500 MG/1
TABLET ORAL
Qty: 540 TABLET | Refills: 0 | Status: SHIPPED | OUTPATIENT
Start: 2021-05-11 | End: 2021-07-19

## 2021-06-24 DIAGNOSIS — M05.79 RHEUMATOID ARTHRITIS INVOLVING MULTIPLE SITES WITH POSITIVE RHEUMATOID FACTOR (HCC): Primary | ICD-10-CM

## 2021-06-24 RX ORDER — ADALIMUMAB 40MG/0.4ML
40 KIT SUBCUTANEOUS
Qty: 2 EACH | Refills: 1 | Status: SHIPPED | OUTPATIENT
Start: 2021-06-24 | End: 2022-01-10 | Stop reason: CLARIF

## 2021-06-24 NOTE — TELEPHONE ENCOUNTER
Received fax from pharmacy requesting refill on Humira medication.  Rx pended for approval     Last OV 4/16/21  Future OV 7/19/21  Recent Labs 4/16/21

## 2021-06-29 ENCOUNTER — TELEPHONE (OUTPATIENT)
Dept: RHEUMATOLOGY | Age: 31
End: 2021-06-29

## 2021-06-29 NOTE — TELEPHONE ENCOUNTER
Pt has new insurance requiring new prior authorization for Humira medication. Completed PA via Cover My Meds. [id number];PPCCVU:[id number]; Review Type:Prior Auth; Coverage Start Date:05/30/2021; Coverage End Date:06/29/2022; Also scanned in new insurance cards as well.

## 2021-07-19 ENCOUNTER — OFFICE VISIT (OUTPATIENT)
Dept: RHEUMATOLOGY | Age: 31
End: 2021-07-19
Payer: COMMERCIAL

## 2021-07-19 VITALS
HEIGHT: 62 IN | SYSTOLIC BLOOD PRESSURE: 120 MMHG | WEIGHT: 215 LBS | DIASTOLIC BLOOD PRESSURE: 80 MMHG | BODY MASS INDEX: 39.56 KG/M2

## 2021-07-19 DIAGNOSIS — Z79.899 HIGH RISK MEDICATION USE: ICD-10-CM

## 2021-07-19 DIAGNOSIS — M05.79 RHEUMATOID ARTHRITIS INVOLVING MULTIPLE SITES WITH POSITIVE RHEUMATOID FACTOR (HCC): Primary | ICD-10-CM

## 2021-07-19 PROCEDURE — 99214 OFFICE O/P EST MOD 30 MIN: CPT | Performed by: INTERNAL MEDICINE

## 2021-07-19 RX ORDER — ADALIMUMAB 40MG/0.4ML
KIT SUBCUTANEOUS
Qty: 2 EACH | Refills: 3 | Status: SHIPPED | OUTPATIENT
Start: 2021-07-19

## 2021-07-19 NOTE — PROGRESS NOTES
[address] [facility] MD                                                                                                                         (F) [phone] (F)      Dear  No primary care provider on file.:  Please find Rheumatology assessment. Thank you for giving me the opportunity to be involved in Laura Elam's care and I look forward following Laura along with you. If you have any questions or concerns please feel free to reach me. Note is transcribed using voice recognition software. Inadvertent computerized transcription errors may be present. Patient identification: Evie Campos: 1990,30 y.o. Sex: female     A/P  Laura was seen today for follow-up. Diagnoses and all orders for this visit:    Rheumatoid arthritis involving multiple sites with positive rheumatoid factor (HCC)    High risk medication use    Other orders  -     Adalimumab (HUMIRA PEN) 40 MG/0.4ML PNKT; Inject 40 mg sc Q 14 days      Rheumatoid arthritis-onset 2012-in remission on Humira 40 mg every 2 weekly (started 1/20/2018). Has been tapering off of sulfasalazine, now taking 1 tablet twice daily. Does not notice any change in his symptoms while lowering sulfasalazine. Normal ADLs and recreational activities. .    Previous therapy included methotrexate, Plaquenil and Enbrel-lost efficacy after 1 year, Orencia-did not help. Plan-  Safety labs have been stable. Discontinue sulfasalazine. If she does notice worsening arthralgias, advised patient to resume 1 tablet of sulfasalazine twice daily. Continue Humira 40 mg every other week. Follow-up in 3 months. Patient indicates understanding and agrees with the management plan.   I reviewed patient's history, referral documents and electronic medical records. #######################################################################    [id number]-jj for seronegative rheumatoid arthritis. Maxwell Camarillo is doing well, does not have any active symptoms of rheumatoid arthritis. No pain, swelling, AM stiffness. Normal ADLs and recreational activities. We have been tapering sulfasalazine, remains asymptomatic. All other ROS are negative. No family history of autoimmune diseases    Current Outpatient Medications   Medication Sig Dispense Refill    Adalimumab (HUMIRA PEN) 40 MG/0.4ML PNKT Inject 40 mg sc Q 14 days 2 each 3    Adalimumab (HUMIRA) 40 MG/0.4ML PSKT Inject 40 mg into the skin every 14 days 2 each 1    Multiple Vitamins-Minerals (THERAPEUTIC MULTIVITAMIN-MINERALS) tablet Take 1 tablet by mouth daily       No current facility-administered medications for this visit. Allergies   Allergen Reactions    Amoxicillin     Pcn [Penicillins]        PHYSICAL EXAM:    Vitals:    /80   Ht 5' 2\" (1.575 m)   Wt 215 lb (97.5 kg)   BMI 39.32 kg/m²   General appearance/ Psychiatric: well nourished, and well groomed, normal judgement, alert, appears stated age and cooperative. Musculoskeletal-normal musculoskeletal examination and upper, lower extremities and spine. No focally tender, swollen or inflamed joints in upper or lower extremities. Normal gait and muscle strength upper and lower extremities. Skin-no rashes. Normal effort of breathing.     DATA:   Lab Results   Component Value Date    WBC 5.6 04/16/2021    HGB 12.1 04/16/2021    HCT 36.0 04/16/2021    MCV 93.8 04/16/2021     04/16/2021     Lab Results   Component Value Date     03/09/2020    K 4.0 03/09/2020     03/09/2020    CO2 22 03/09/2020    BUN 12 03/09/2020    CREATININE 0.6 04/16/2021    GLUCOSE 93 03/09/2020    CALCIUM 9.2 03/09/2020    PROT 7.4 03/09/2020    LABALBU 4.8 03/09/2020    BILITOT 0.7 03/09/2020    ALKPHOS 62 03/09/2020    AST 17 04/16/2021    ALT 15 04/16/2021    LABGLOM >60 04/16/2021    GFRAA >60 04/16/2021    AGRATIO 1.8 03/09/2020    GLOB 2.6 03/09/2020       Lab Results   Component Value Date    SEDRATE 15 04/16/2021     Lab Results   Component Value Date    SEDRATE 15 04/16/2021       Total time 31 minutes that includes the following-  Preparing to see the patient such as reviewing patients records, pre-charting, preparing the visit on the same day, performing a medically appropriate history and physical examination, counseling and educating patient about diagnosis, management plan, ordering appropriate testings, prescriptions, communicating findings to other care providers, and documenting clinical information in electronic medical record.

## 2021-10-20 ENCOUNTER — OFFICE VISIT (OUTPATIENT)
Dept: RHEUMATOLOGY | Age: 31
End: 2021-10-20
Payer: COMMERCIAL

## 2021-10-20 VITALS
SYSTOLIC BLOOD PRESSURE: 120 MMHG | WEIGHT: 215 LBS | BODY MASS INDEX: 39.56 KG/M2 | HEIGHT: 62 IN | DIASTOLIC BLOOD PRESSURE: 76 MMHG

## 2021-10-20 DIAGNOSIS — Z79.899 HIGH RISK MEDICATION USE: ICD-10-CM

## 2021-10-20 DIAGNOSIS — M05.79 RHEUMATOID ARTHRITIS INVOLVING MULTIPLE SITES WITH POSITIVE RHEUMATOID FACTOR (HCC): Primary | ICD-10-CM

## 2021-10-20 LAB
A/G RATIO: 1.5 (ref 1.1–2.2)
ALBUMIN SERPL-MCNC: 4.8 G/DL (ref 3.4–5)
ALP BLD-CCNC: 58 U/L (ref 40–129)
ALT SERPL-CCNC: 15 U/L (ref 10–40)
ANION GAP SERPL CALCULATED.3IONS-SCNC: 13 MMOL/L (ref 3–16)
AST SERPL-CCNC: 20 U/L (ref 15–37)
BASOPHILS ABSOLUTE: 0 K/UL (ref 0–0.2)
BASOPHILS RELATIVE PERCENT: 0.4 %
BILIRUB SERPL-MCNC: 0.6 MG/DL (ref 0–1)
BUN BLDV-MCNC: 14 MG/DL (ref 7–20)
C-REACTIVE PROTEIN: 3.6 MG/L (ref 0–5.1)
CALCIUM SERPL-MCNC: 9.4 MG/DL (ref 8.3–10.6)
CHLORIDE BLD-SCNC: 104 MMOL/L (ref 99–110)
CHOLESTEROL, TOTAL: 218 MG/DL (ref 0–199)
CO2: 23 MMOL/L (ref 21–32)
CREAT SERPL-MCNC: 0.6 MG/DL (ref 0.6–1.1)
EOSINOPHILS ABSOLUTE: 0.1 K/UL (ref 0–0.6)
EOSINOPHILS RELATIVE PERCENT: 1.6 %
GFR AFRICAN AMERICAN: >60
GFR NON-AFRICAN AMERICAN: >60
GLOBULIN: 3.1 G/DL
GLUCOSE BLD-MCNC: 103 MG/DL (ref 70–99)
HCT VFR BLD CALC: 36.1 % (ref 36–48)
HDLC SERPL-MCNC: 54 MG/DL (ref 40–60)
HEMOGLOBIN: 12.1 G/DL (ref 12–16)
LDL CHOLESTEROL CALCULATED: 140 MG/DL
LYMPHOCYTES ABSOLUTE: 2.2 K/UL (ref 1–5.1)
LYMPHOCYTES RELATIVE PERCENT: 43.4 %
MCH RBC QN AUTO: 31.2 PG (ref 26–34)
MCHC RBC AUTO-ENTMCNC: 33.4 G/DL (ref 31–36)
MCV RBC AUTO: 93.2 FL (ref 80–100)
MONOCYTES ABSOLUTE: 0.4 K/UL (ref 0–1.3)
MONOCYTES RELATIVE PERCENT: 8.9 %
NEUTROPHILS ABSOLUTE: 2.3 K/UL (ref 1.7–7.7)
NEUTROPHILS RELATIVE PERCENT: 45.7 %
PDW BLD-RTO: 12.4 % (ref 12.4–15.4)
PLATELET # BLD: 254 K/UL (ref 135–450)
PMV BLD AUTO: 7.7 FL (ref 5–10.5)
POTASSIUM SERPL-SCNC: 4.4 MMOL/L (ref 3.5–5.1)
RBC # BLD: 3.87 M/UL (ref 4–5.2)
SODIUM BLD-SCNC: 140 MMOL/L (ref 136–145)
TOTAL PROTEIN: 7.9 G/DL (ref 6.4–8.2)
TRIGL SERPL-MCNC: 120 MG/DL (ref 0–150)
VLDLC SERPL CALC-MCNC: 24 MG/DL
WBC # BLD: 5 K/UL (ref 4–11)

## 2021-10-20 PROCEDURE — 85025 COMPLETE CBC W/AUTO DIFF WBC: CPT | Performed by: INTERNAL MEDICINE

## 2021-10-20 PROCEDURE — 99214 OFFICE O/P EST MOD 30 MIN: CPT | Performed by: INTERNAL MEDICINE

## 2021-10-20 RX ORDER — ADALIMUMAB 40MG/0.4ML
KIT SUBCUTANEOUS
Qty: 2 EACH | Refills: 1 | Status: SHIPPED | OUTPATIENT
Start: 2021-10-20 | End: 2022-01-10 | Stop reason: CLARIF

## 2021-10-20 NOTE — PROGRESS NOTES
[address] [place] [phone] (P) [phone] (F)      Dear  No primary care provider on file.:  Please find Rheumatology assessment. Thank you for giving me the opportunity to be involved in Laura Elam's care and I look forward following Laura along with you. If you have any questions or concerns please feel free to reach me. Note is transcribed using voice recognition software. Inadvertent computerized transcription errors may be present. Patient identification: Deyanira Dash: 1990,31 y.o. Sex: female     A/P  Laura was seen today for follow-up. Diagnoses and all orders for this visit:    Rheumatoid arthritis involving multiple sites with positive rheumatoid factor (HCC)    High risk medication use  -     Comprehensive Metabolic Panel  -     Lipid Panel  -     CBC WITH DIFFERENTIAL/PLATELET  -     C-Reactive Protein    BMI 39.0-39.9,adult    Other orders  -     Adalimumab (HUMIRA PEN) 40 MG/0.4ML PNKT; Inject 40 mg sc Q 14 days      Rheumatoid arthritis-seronegative-onset 2012-in remission on Humira 40 mg every 2 weekly (started 1/20/2018) and sulfasalazine 1 tablet twice daily. No flares of symptoms. BMI 39-trying to lose weight, working towards her goals. Went over weight management including eating habits, calorie counting as well as portion. Check safety labs today. Prescriptions renewed. She is tapering sulfasalazine as her RA is under good control. She can finish current supply and see how she does. Recommend COVID-19 vaccine third dose. Follow-up in 3 months. Patient indicates understanding and agrees with the management plan.   I reviewed patient's history, referral documents and electronic medical records. #######################################################################    Fqcurowhbz-nvtvja-bp for seronegative rheumatoid arthritis. Interval changes-rheumatoid arthritis is in remission on current regimen. Denies any pain swelling or stiffness in the joints. Normal ADLs and recreational activities. Tolerating medications well. All other ROS are negative. No family history of autoimmune diseases    Current Outpatient Medications   Medication Sig Dispense Refill    Adalimumab (HUMIRA PEN) 40 MG/0.4ML PNKT Inject 40 mg sc Q 14 days 2 each 1    Adalimumab (HUMIRA PEN) 40 MG/0.4ML PNKT Inject 40 mg sc Q 14 days 2 each 3    Adalimumab (HUMIRA) 40 MG/0.4ML PSKT Inject 40 mg into the skin every 14 days 2 each 1    Multiple Vitamins-Minerals (THERAPEUTIC MULTIVITAMIN-MINERALS) tablet Take 1 tablet by mouth daily       No current facility-administered medications for this visit. Allergies   Allergen Reactions    Amoxicillin     Pcn [Penicillins]        PHYSICAL EXAM:    Vitals:    /76   Ht 5' 2\" (1.575 m)   Wt 215 lb (97.5 kg)   BMI 39.32 kg/m²   General appearance/ Psychiatric: well nourished, and well groomed, normal judgement, alert, appears stated age and cooperative. Musculoskeletal-normal musculoskeletal examination in upper, lower extremities and spine. Full range of motion all peripheral joints. Skin-no rashes. Normal effort of breathing.     DATA:   Lab Results   Component Value Date    WBC 5.6 04/16/2021    HGB 12.1 04/16/2021    HCT 36.0 04/16/2021    MCV 93.8 04/16/2021     04/16/2021     Lab Results   Component Value Date     03/09/2020    K 4.0 03/09/2020     03/09/2020    CO2 22 03/09/2020    BUN 12 03/09/2020    CREATININE 0.6 04/16/2021    GLUCOSE 93 03/09/2020    CALCIUM 9.2 03/09/2020    PROT 7.4 03/09/2020    LABALBU 4.8 03/09/2020    BILITOT 0.7 03/09/2020    ALKPHOS 62 03/09/2020    AST 17 04/16/2021    ALT 15 04/16/2021    LABGLOM >60 04/16/2021    GFRAA >60 04/16/2021    AGRATIO 1.8 03/09/2020    GLOB 2.6 03/09/2020       Lab Results   Component Value Date    SEDRATE 15 04/16/2021     Lab Results   Component Value Date    SEDRATE 15 04/16/2021       Total time 32minutes that includes the following-  Preparing to see the patient such as reviewing patients records, pre-charting, preparing the visit on the same day, performing a medically appropriate history and physical examination, counseling and educating patient about diagnosis, management plan, ordering appropriate testings, prescriptions, communicating findings to other care providers, and documenting clinical information in electronic medical record.

## 2022-01-09 NOTE — PROGRESS NOTES
Well controlled. Sees Dr Tomas Wynn every 3mo    GYN:  Sees Nickolas Bradshaw in [facility]. Has apt this wk for pap. No birth control. Periods regular. WT:  Trying to lose wt  Last   Trying work on diet. Cut out fatty foods. Eating healthier fruits and vegetables. DERM:  Mole removed on back few years ago and was cancerous. Does follow with dermatology group for yearly skin checks. ANXIETY:  Seeing therapist regularly and this helps. Uses coping mechanisms   Never been on meds. Sleeps fair. Wakes up frequently during the night. Feels rested upon awakening. FH: PGM colon cancer, PAunt breast cancer, thyroid problems (MGM), hypertension, hyperlipidemia (both sides)  Dad had a brain tumor, but benign. covid vaccines x 3     ROS:  Review of Systems   Constitutional: Negative for activity change, appetite change, chills, fatigue and unexpected weight change. HENT: Negative for congestion, ear pain, hearing loss, nosebleeds, postnasal drip, sneezing and sore throat. Respiratory: Negative for cough, chest tightness, shortness of breath and wheezing. Cardiovascular: Negative for chest pain, palpitations and leg swelling. Gastrointestinal: Negative for abdominal pain, blood in stool, constipation, diarrhea, nausea and vomiting. Endocrine: Negative for polydipsia, polyphagia and polyuria. Genitourinary: Negative for difficulty urinating, dysuria, menstrual problem and pelvic pain. Musculoskeletal: Negative for arthralgias, back pain and neck pain. H/o RA   Skin: Negative for color change, pallor and rash. Allergic/Immunologic: Positive for immunocompromised state. Neurological: Positive for headaches (occ). Negative for dizziness, tremors and numbness. Hematological: Does not bruise/bleed easily. Psychiatric/Behavioral: Negative for agitation, dysphoric mood and sleep disturbance. The patient is nervous/anxious (in therapy).        VITALS:  BP 126/82   Pulse 72   Temp 98.6 °F (37 °C) (Oral)   Ht 5' 2\" (1.575 m)   Wt 217 lb 9.6 oz (98.7 kg)   LMP 12/30/2021   SpO2 98%   BMI 39.80 kg/m²    BP Readings from Last 3 Encounters:   01/10/22 126/82   10/20/21 120/76   07/19/21 120/80     Wt Readings from Last 3 Encounters:   01/10/22 217 lb 9.6 oz (98.7 kg)   10/20/21 215 lb (97.5 kg)   07/19/21 215 lb (97.5 kg)     PE:  Physical Exam  Vitals and nursing note reviewed. Constitutional:       General: She is not in acute distress. Appearance: Normal appearance. She is well-developed and normal weight. She is not diaphoretic. HENT:      Head: Normocephalic and atraumatic. Neck:      Thyroid: No thyromegaly. Vascular: No carotid bruit or JVD. Trachea: No tracheal deviation. Cardiovascular:      Rate and Rhythm: Normal rate and regular rhythm. Heart sounds: Normal heart sounds. No murmur heard. No friction rub. Pulmonary:      Effort: Pulmonary effort is normal. No respiratory distress. Breath sounds: Normal breath sounds. No stridor. No wheezing, rhonchi or rales. Abdominal:      General: Abdomen is flat. Bowel sounds are normal. There is no distension. Palpations: Abdomen is soft. There is no mass. Tenderness: There is no abdominal tenderness. There is no guarding or rebound. Hernia: No hernia is present. Musculoskeletal:         General: No deformity. Normal range of motion. Cervical back: Normal range of motion and neck supple. Right lower leg: No edema. Left lower leg: No edema. Lymphadenopathy:      Cervical: No cervical adenopathy. Skin:     General: Skin is warm and dry. Coloration: Skin is not pale. Findings: No erythema or rash. Neurological:      General: No focal deficit present. Mental Status: She is alert and oriented to person, place, and time. Motor: No weakness.       Gait: Gait normal.   Psychiatric:         Mood and Affect: Mood normal. Behavior: Behavior normal.         Thought Content: Thought content normal.         Judgment: Judgment normal.        Lab Results   Component Value Date    WBC 5.0 10/20/2021    HGB 12.1 10/20/2021    HCT 36.1 10/20/2021    MCV 93.2 10/20/2021     10/20/2021     Lab Results   Component Value Date    CHOL 218 (H) 10/20/2021     Lab Results   Component Value Date    TRIG 120 10/20/2021     Lab Results   Component Value Date    HDL 54 10/20/2021     Lab Results   Component Value Date    LDLCALC 140 (H) 10/20/2021     Lab Results   Component Value Date    LABVLDL 24 10/20/2021     No results found for: [facility]  Lab Results   Component Value Date     10/20/2021    K 4.4 10/20/2021     10/20/2021    CO2 23 10/20/2021    BUN 14 10/20/2021    CREATININE 0.6 10/20/2021    GLUCOSE 103 (H) 10/20/2021    CALCIUM 9.4 10/20/2021    PROT 7.9 10/20/2021    LABALBU 4.8 10/20/2021    BILITOT 0.6 10/20/2021    ALKPHOS 58 10/20/2021    AST 20 10/20/2021    ALT 15 10/20/2021    LABGLOM >60 10/20/2021    GFRAA >60 10/20/2021    AGRATIO 1.5 10/20/2021    GLOB 3.1 10/20/2021       ASSESSMENT/PLAN:  1. Annual physical exam  Previous labs reviewed and discussed. Will check FLP in 6mo  Continue with dietary and wt loss efforts. Pap scheduled this wk per GYN. Vaccines utd. 2. Rheumatoid arthritis involving multiple joints (HCC)  Continue per rheumatology  Notes reviewed. 3. History of skin cancer  Continue per derm for annual skin check. 4. Anxiety  Stable. Continue per therapist.   Declines rx. Return in about 6 months (around 7/10/2022) for chronic visit.      Electronically signed by ADONIS Roberson CNP on 1/10/2022 at 4:39 PM

## 2022-01-10 ENCOUNTER — OFFICE VISIT (OUTPATIENT)
Dept: PRIMARY CARE CLINIC | Age: 32
End: 2022-01-10
Payer: COMMERCIAL

## 2022-01-10 VITALS
WEIGHT: 217.6 LBS | TEMPERATURE: 98.6 F | DIASTOLIC BLOOD PRESSURE: 82 MMHG | BODY MASS INDEX: 40.04 KG/M2 | OXYGEN SATURATION: 98 % | HEART RATE: 72 BPM | HEIGHT: 62 IN | SYSTOLIC BLOOD PRESSURE: 126 MMHG

## 2022-01-10 DIAGNOSIS — Z00.00 ANNUAL PHYSICAL EXAM: Primary | ICD-10-CM

## 2022-01-10 DIAGNOSIS — Z85.828 HISTORY OF SKIN CANCER: ICD-10-CM

## 2022-01-10 DIAGNOSIS — F41.9 ANXIETY: ICD-10-CM

## 2022-01-10 DIAGNOSIS — M06.9 RHEUMATOID ARTHRITIS INVOLVING MULTIPLE JOINTS (HCC): ICD-10-CM

## 2022-01-10 PROCEDURE — 99385 PREV VISIT NEW AGE 18-39: CPT | Performed by: NURSE PRACTITIONER

## 2022-01-10 SDOH — HEALTH STABILITY: PHYSICAL HEALTH: ON AVERAGE, HOW MANY MINUTES DO YOU ENGAGE IN EXERCISE AT THIS LEVEL?: 30 MIN

## 2022-01-10 SDOH — ECONOMIC STABILITY: FOOD INSECURITY: WITHIN THE PAST 12 MONTHS, THE FOOD YOU BOUGHT JUST DIDN'T LAST AND YOU DIDN'T HAVE MONEY TO GET MORE.: NEVER TRUE

## 2022-01-10 SDOH — ECONOMIC STABILITY: FOOD INSECURITY: WITHIN THE PAST 12 MONTHS, YOU WORRIED THAT YOUR FOOD WOULD RUN OUT BEFORE YOU GOT MONEY TO BUY MORE.: NEVER TRUE

## 2022-01-10 SDOH — HEALTH STABILITY: PHYSICAL HEALTH: ON AVERAGE, HOW MANY DAYS PER WEEK DO YOU ENGAGE IN MODERATE TO STRENUOUS EXERCISE (LIKE A BRISK WALK)?: 7 DAYS

## 2022-01-10 ASSESSMENT — ENCOUNTER SYMPTOMS
COLOR CHANGE: 0
DIARRHEA: 0
COUGH: 0
WHEEZING: 0
NAUSEA: 0
SHORTNESS OF BREATH: 0
ABDOMINAL PAIN: 0
VOMITING: 0
CONSTIPATION: 0
BLOOD IN STOOL: 0
CHEST TIGHTNESS: 0
SORE THROAT: 0
BACK PAIN: 0

## 2022-01-10 ASSESSMENT — SOCIAL DETERMINANTS OF HEALTH (SDOH)
WITHIN THE LAST YEAR, HAVE TO BEEN RAPED OR FORCED TO HAVE ANY KIND OF SEXUAL ACTIVITY BY YOUR PARTNER OR EX-PARTNER?: NO
HOW HARD IS IT FOR YOU TO PAY FOR THE VERY BASICS LIKE FOOD, HOUSING, MEDICAL CARE, AND HEATING?: NOT HARD AT ALL
WITHIN THE LAST YEAR, HAVE YOU BEEN HUMILIATED OR EMOTIONALLY ABUSED IN OTHER WAYS BY YOUR PARTNER OR EX-PARTNER?: NO
WITHIN THE LAST YEAR, HAVE YOU BEEN AFRAID OF YOUR PARTNER OR EX-PARTNER?: NO
WITHIN THE LAST YEAR, HAVE YOU BEEN KICKED, HIT, SLAPPED, OR OTHERWISE PHYSICALLY HURT BY YOUR PARTNER OR EX-PARTNER?: NO

## 2022-01-10 ASSESSMENT — PATIENT HEALTH QUESTIONNAIRE - PHQ9
1. LITTLE INTEREST OR PLEASURE IN DOING THINGS: 0
SUM OF ALL RESPONSES TO PHQ QUESTIONS 1-9: 0
SUM OF ALL RESPONSES TO PHQ9 QUESTIONS 1 & 2: 0
2. FEELING DOWN, DEPRESSED OR HOPELESS: 0

## 2022-01-26 ENCOUNTER — OFFICE VISIT (OUTPATIENT)
Dept: RHEUMATOLOGY | Age: 32
End: 2022-01-26
Payer: COMMERCIAL

## 2022-01-26 VITALS
BODY MASS INDEX: 39.93 KG/M2 | DIASTOLIC BLOOD PRESSURE: 80 MMHG | WEIGHT: 217 LBS | HEIGHT: 62 IN | SYSTOLIC BLOOD PRESSURE: 120 MMHG

## 2022-01-26 DIAGNOSIS — Z79.899 HIGH RISK MEDICATION USE: ICD-10-CM

## 2022-01-26 DIAGNOSIS — M05.79 RHEUMATOID ARTHRITIS INVOLVING MULTIPLE SITES WITH POSITIVE RHEUMATOID FACTOR (HCC): Primary | ICD-10-CM

## 2022-01-26 LAB
A/G RATIO: 1.8 (ref 1.1–2.2)
ALBUMIN SERPL-MCNC: 4.9 G/DL (ref 3.4–5)
ALP BLD-CCNC: 51 U/L (ref 40–129)
ALT SERPL-CCNC: 15 U/L (ref 10–40)
ANION GAP SERPL CALCULATED.3IONS-SCNC: 12 MMOL/L (ref 3–16)
AST SERPL-CCNC: 14 U/L (ref 15–37)
BASOPHILS ABSOLUTE: 0 K/UL (ref 0–0.2)
BASOPHILS RELATIVE PERCENT: 0.2 %
BILIRUB SERPL-MCNC: 0.9 MG/DL (ref 0–1)
BUN BLDV-MCNC: 14 MG/DL (ref 7–20)
C-REACTIVE PROTEIN: <3 MG/L (ref 0–5.1)
CALCIUM SERPL-MCNC: 9.5 MG/DL (ref 8.3–10.6)
CHLORIDE BLD-SCNC: 102 MMOL/L (ref 99–110)
CO2: 23 MMOL/L (ref 21–32)
CREAT SERPL-MCNC: 0.6 MG/DL (ref 0.6–1.1)
EOSINOPHILS ABSOLUTE: 0.1 K/UL (ref 0–0.6)
EOSINOPHILS RELATIVE PERCENT: 1.3 %
GFR AFRICAN AMERICAN: >60
GFR NON-AFRICAN AMERICAN: >60
GLUCOSE BLD-MCNC: 97 MG/DL (ref 70–99)
HCT VFR BLD CALC: 37.6 % (ref 36–48)
HEMOGLOBIN: 12.8 G/DL (ref 12–16)
LYMPHOCYTES ABSOLUTE: 2.3 K/UL (ref 1–5.1)
LYMPHOCYTES RELATIVE PERCENT: 42.6 %
MCH RBC QN AUTO: 31.2 PG (ref 26–34)
MCHC RBC AUTO-ENTMCNC: 34 G/DL (ref 31–36)
MCV RBC AUTO: 91.6 FL (ref 80–100)
MONOCYTES ABSOLUTE: 0.5 K/UL (ref 0–1.3)
MONOCYTES RELATIVE PERCENT: 8.5 %
NEUTROPHILS ABSOLUTE: 2.6 K/UL (ref 1.7–7.7)
NEUTROPHILS RELATIVE PERCENT: 47.4 %
PDW BLD-RTO: 12.5 % (ref 12.4–15.4)
PLATELET # BLD: 274 K/UL (ref 135–450)
PMV BLD AUTO: 7.7 FL (ref 5–10.5)
POTASSIUM SERPL-SCNC: 4.2 MMOL/L (ref 3.5–5.1)
RBC # BLD: 4.11 M/UL (ref 4–5.2)
SEDIMENTATION RATE, ERYTHROCYTE: 49 MM/HR (ref 0–20)
SODIUM BLD-SCNC: 137 MMOL/L (ref 136–145)
TOTAL PROTEIN: 7.6 G/DL (ref 6.4–8.2)
WBC # BLD: 5.5 K/UL (ref 4–11)

## 2022-01-26 PROCEDURE — 99214 OFFICE O/P EST MOD 30 MIN: CPT | Performed by: INTERNAL MEDICINE

## 2022-01-26 RX ORDER — ADALIMUMAB 40MG/0.4ML
KIT SUBCUTANEOUS
Qty: 2 EACH | Refills: 2 | Status: SHIPPED | OUTPATIENT
Start: 2022-01-26

## 2022-01-26 NOTE — PROGRESS NOTES
[address] [facility], [place]                                                                                                                        [phone] (u) [phone] (F)      Dear Dr. Micaela Morse, APRN - CNP:  Please find Rheumatology assessment. Thank you for giving me the opportunity to be involved in Laura Elam's care and I look forward following Laura along with you. If you have any questions or concerns please feel free to reach me. Note is transcribed using voice recognition software. Inadvertent computerized transcription errors may be present. Patient identification: Jhonnie Rubinstein: 1990,31 y.o. Sex: female     A/P  Laura was seen today for follow-up. Diagnoses and all orders for this visit:    Rheumatoid arthritis involving multiple sites with positive rheumatoid factor ([facility] [address]    High risk medication use  -     Quantiferon, Incubated; Future  -     Comprehensive Metabolic Panel; Future  -     CBC Auto Differential; Future  -     C-Reactive Protein; Future  -     Sedimentation Rate; Future    BMI 39.0-39.9,adult    Other orders  -     Adalimumab (HUMIRA PEN) 40 MG/0.4ML PNKT; Inject 40 mg sc Q 14 days      Rheumatoid arthritis-seronegative-onset 2012-asymptomatic on Humira monotherapy 40 mg every other week. (started 1/20/2018). Off of sulfasalazine since this week. No flares or symptoms of active arthritis. BMI 39-trying to lose weight, working towards her goals. Went over weight management including eating habits, calorie counting as well as portion. Plan-  Continue Humira monotherapy. Check safety labs. Call with any flares of symptoms. Continue working on weight. Follow-up in 3 months. Patient indicates understanding and agrees with the management plan.   I reviewed patient's history, referral documents and electronic medical records. Zcnnuktkll-sawtdi-im for seronegative rheumatoid arthritis. Interval changes-rheumatoid arthritis is in remission on current regimen. No musculoskeletal symptoms. Just finished leftover sulfasalazine earlier this week. Physically active, exercises regularly, is trying to lose weight. Normal ADLs and recreational activities. Tolerating medications well. All other ROS are negative. No family history of autoimmune diseases    Current Outpatient Medications   Medication Sig Dispense Refill    Adalimumab (HUMIRA PEN) 40 MG/0.4ML PNKT Inject 40 mg sc Q 14 days 2 each 2    Adalimumab (HUMIRA PEN) 40 MG/0.4ML PNKT Inject 40 mg sc Q 14 days 2 each 3    Multiple Vitamins-Minerals (THERAPEUTIC MULTIVITAMIN-MINERALS) tablet Take 1 tablet by mouth daily       No current facility-administered medications for this visit. Allergies   Allergen Reactions    Amoxicillin     Pcn [Penicillins]        PHYSICAL EXAM:    Vitals:    /80   Ht 5' 2\" (1.575 m)   Wt 217 lb (98.4 kg)   LMP 12/30/2021   BMI 39.69 kg/m²   General appearance/ Psychiatric: well nourished, and well groomed, normal judgement, alert, appears stated age and cooperative. Musculoskeletal-normal musculoskeletal examination upper, lower extremities and spine without any tender swollen inflamed joints, F ROM in all peripheral joints. Skin-no rashes. Normal effort of breathing.     DATA:   Lab Results   Component Value Date    WBC 5.0 10/20/2021    HGB 12.1 10/20/2021    HCT 36.1 10/20/2021    MCV 93.2 10/20/2021     10/20/2021     Lab Results   Component Value Date     10/20/2021    K 4.4 10/20/2021     10/20/2021    CO2 23 10/20/2021    BUN 14 10/20/2021    CREATININE 0.6 10/20/2021    GLUCOSE 103 (H) 10/20/2021    CALCIUM 9.4 10/20/2021    PROT 7.9 10/20/2021    LABALBU 4.8 10/20/2021    BILITOT 0.6 10/20/2021    ALKPHOS 58 10/20/2021 AST 20 10/20/2021    ALT 15 10/20/2021    LABGLOM >60 10/20/2021    GFRAA >60 10/20/2021    AGRATIO 1.5 10/20/2021    GLOB 3.1 10/20/2021       Lab Results   Component Value Date    SEDRATE 15 04/16/2021     Lab Results   Component Value Date    SEDRATE 15 04/16/2021       Total time 30 minutes that includes the following-  Preparing to see the patient such as reviewing patients records, pre-charting, preparing the visit on the same day, performing a medically appropriate history and physical examination, counseling and educating patient about diagnosis, management plan, ordering appropriate testings, prescriptions, communicating findings to other care providers, and documenting clinical information in electronic medical record.

## 2022-01-29 LAB
QUANTI TB GOLD PLUS: NEGATIVE
QUANTI TB1 MINUS NIL: 0 IU/ML (ref 0–0.34)
QUANTI TB2 MINUS NIL: 0 IU/ML (ref 0–0.34)
QUANTIFERON MITOGEN: 9.75 IU/ML
QUANTIFERON NIL: 0.02 IU/ML

## 2022-04-26 ENCOUNTER — OFFICE VISIT (OUTPATIENT)
Dept: RHEUMATOLOGY | Age: 32
End: 2022-04-26
Payer: COMMERCIAL

## 2022-04-26 VITALS
SYSTOLIC BLOOD PRESSURE: 110 MMHG | BODY MASS INDEX: 39.93 KG/M2 | HEIGHT: 62 IN | WEIGHT: 217 LBS | DIASTOLIC BLOOD PRESSURE: 74 MMHG

## 2022-04-26 DIAGNOSIS — M05.79 RHEUMATOID ARTHRITIS INVOLVING MULTIPLE SITES WITH POSITIVE RHEUMATOID FACTOR (HCC): Primary | ICD-10-CM

## 2022-04-26 DIAGNOSIS — Z79.899 HIGH RISK MEDICATION USE: ICD-10-CM

## 2022-04-26 PROCEDURE — 99214 OFFICE O/P EST MOD 30 MIN: CPT | Performed by: INTERNAL MEDICINE

## 2022-04-26 RX ORDER — ADALIMUMAB 40MG/0.4ML
KIT SUBCUTANEOUS
Qty: 2 EACH | Refills: 4 | Status: SHIPPED | OUTPATIENT
Start: 2022-04-26

## 2022-04-26 NOTE — PROGRESS NOTES
[address], [place]                                                                                                                        [phone] (R) [phone] (F)      Dear Dr. Calderon Botello, APRN - CNP:  Please find Rheumatology assessment. Thank you for giving me the opportunity to be involved in Laura Elam's care and I look forward following Laura along with you. If you have any questions or concerns please feel free to reach me. Note is transcribed using voice recognition software. Inadvertent computerized transcription errors may be present. Patient identification: Vicenta Manual: 1990,31 y.o. Sex: female     A/P  Laura was seen today for follow-up. Diagnoses and all orders for this visit:    Rheumatoid arthritis involving multiple sites with positive rheumatoid factor (HCC)    High risk medication use    BMI 39.0-39.9,adult      Rheumatoid arthritis-seronegative-onset 2012-in remission on Humira monotherapy 40 mg every other week. (started 1/20/2018). No flares or symptoms of active arthritis. BMI 39-lost 15 pounds since last seen 3 months ago. Has done a great job-choosing healthy snacks, watching portions and trying to exercise more regularly. Encouraged patient to keep up the great work. Plan-  Safety labs are normal, reviewed from epic. Stay on current dose of Humira, prescription authorized. Rest as above. Follow-up in 3 months. Patient indicates understanding and agrees with the management plan. I reviewed patient's history, referral documents and electronic medical records. ###################################################################################    Juerrpunvf-vaqkyx-xm for seronegative rheumatoid arthritis.       Interval Precilla Sox is AGRATIO 1.8 01/26/2022    GLOB 3.1 10/20/2021       Lab Results   Component Value Date    SEDRATE 49 (H) 01/26/2022     Lab Results   Component Value Date    SEDRATE 49 (H) 01/26/2022       Total time 30 minutes that includes the following-  Preparing to see the patient such as reviewing patients records, pre-charting, preparing the visit on the same day, performing a medically appropriate history and physical examination, counseling and educating patient about diagnosis, management plan, ordering appropriate testings, prescriptions, communicating findings to other care providers, and documenting clinical information in electronic medical record.

## 2022-07-25 ENCOUNTER — TELEPHONE (OUTPATIENT)
Dept: RHEUMATOLOGY | Age: 32
End: 2022-07-25

## 2022-07-25 DIAGNOSIS — Z79.899 HIGH RISK MEDICATION USE: Primary | ICD-10-CM

## 2022-07-25 NOTE — TELEPHONE ENCOUNTER
Patient is requesting a referral to [facility]. [place]

## 2022-09-01 ENCOUNTER — TELEPHONE (OUTPATIENT)
Dept: RHEUMATOLOGY | Age: 32
End: 2022-09-01

## 2022-09-01 NOTE — TELEPHONE ENCOUNTER
Patient called requesting copy of medical records be faxed to new Rheumatology office.  Faxed all office visit notes to [phone] [facility]